# Patient Record
Sex: FEMALE | Race: ASIAN | NOT HISPANIC OR LATINO | URBAN - METROPOLITAN AREA
[De-identification: names, ages, dates, MRNs, and addresses within clinical notes are randomized per-mention and may not be internally consistent; named-entity substitution may affect disease eponyms.]

---

## 2019-06-26 ENCOUNTER — EMERGENCY (EMERGENCY)
Facility: HOSPITAL | Age: 54
LOS: 1 days | Discharge: HOME | End: 2019-06-26
Attending: EMERGENCY MEDICINE
Payer: COMMERCIAL

## 2019-06-26 VITALS
RESPIRATION RATE: 16 BRPM | DIASTOLIC BLOOD PRESSURE: 84 MMHG | HEIGHT: 63 IN | SYSTOLIC BLOOD PRESSURE: 113 MMHG | HEART RATE: 96 BPM | WEIGHT: 123.9 LBS | TEMPERATURE: 98 F | OXYGEN SATURATION: 98 %

## 2019-06-26 PROCEDURE — 99283 EMERGENCY DEPT VISIT LOW MDM: CPT

## 2019-06-26 RX ORDER — DIAZEPAM 5 MG
5 TABLET ORAL ONCE
Refills: 0 | Status: DISCONTINUED | OUTPATIENT
Start: 2019-06-26 | End: 2019-06-26

## 2019-06-26 RX ORDER — METHOCARBAMOL 500 MG/1
1 TABLET, FILM COATED ORAL
Qty: 15 | Refills: 0
Start: 2019-06-26 | End: 2019-06-30

## 2019-06-26 RX ORDER — IBUPROFEN 200 MG
600 TABLET ORAL ONCE
Refills: 0 | Status: COMPLETED | OUTPATIENT
Start: 2019-06-26 | End: 2019-06-26

## 2019-06-26 RX ORDER — IBUPROFEN 200 MG
1 TABLET ORAL
Qty: 30 | Refills: 0
Start: 2019-06-26

## 2019-06-26 RX ADMIN — Medication 600 MILLIGRAM(S): at 14:40

## 2019-06-26 RX ADMIN — Medication 5 MILLIGRAM(S): at 14:40

## 2019-06-26 RX ADMIN — Medication 600 MILLIGRAM(S): at 14:42

## 2019-06-26 NOTE — ED PROVIDER NOTE - OBJECTIVE STATEMENT
54 y/o F patient with no significant PMHx and no significant PSHx presents to the ED with lower back pain which began x2-x3 days ago. Patient denies trauma or injury. Pt states her pain is localized to the lower back with no radiation into the legs, upper back or abdomen. Patient says her pain is worse with twisting and bending, however, better with heat and lying still. Patient denies fever, chest pain, SOB, nausea, vomiting, diarrhea, abdominal pain, urinary complaints, numbness/ tingling in the legs, and any other complaints. Patient says she has tried Naproxen with mild relief.

## 2019-06-26 NOTE — ED PROVIDER NOTE - CLINICAL SUMMARY MEDICAL DECISION MAKING FREE TEXT BOX
Lower back pain suspected MSK ideology. Will d/c with additional meds such as muscle relaxer and ibuprofen.

## 2019-06-26 NOTE — ED PROVIDER NOTE - CHPI ED SYMPTOMS NEG
no tingling/No fever, No chest pain, No SOB, No nausea, No vomiting, No diarrhea, No abdominal pain, No urinary complaints/no numbness

## 2019-06-26 NOTE — ED ADULT NURSE NOTE - OBJECTIVE STATEMENT
Pt c/o low back pain x 2 days, denies any injury/ trauma to the area. Denies difficulty urinating. No acute distress noted, denies chest pain, no shortness of breath Indicated. Safety maintained.

## 2020-12-04 ENCOUNTER — EMERGENCY (EMERGENCY)
Facility: HOSPITAL | Age: 55
LOS: 1 days | Discharge: HOME | End: 2020-12-04
Attending: EMERGENCY MEDICINE | Admitting: EMERGENCY MEDICINE
Payer: MEDICAID

## 2020-12-04 VITALS
SYSTOLIC BLOOD PRESSURE: 134 MMHG | TEMPERATURE: 98 F | HEIGHT: 63 IN | DIASTOLIC BLOOD PRESSURE: 104 MMHG | RESPIRATION RATE: 16 BRPM | OXYGEN SATURATION: 100 % | HEART RATE: 100 BPM

## 2020-12-04 PROCEDURE — 99283 EMERGENCY DEPT VISIT LOW MDM: CPT

## 2020-12-04 NOTE — ED ADULT TRIAGE NOTE - CHIEF COMPLAINT QUOTE
RT arm pain x 3 weeks and blotching to upper arm. +ROM to affected extremity. +radial pulse and no discoloration observed. Denies any injury to affected extremity/ CP/SOB. Hx Anxiety, HTN, Arthritis, HLD

## 2020-12-05 VITALS
SYSTOLIC BLOOD PRESSURE: 130 MMHG | HEART RATE: 85 BPM | RESPIRATION RATE: 16 BRPM | OXYGEN SATURATION: 98 % | DIASTOLIC BLOOD PRESSURE: 85 MMHG

## 2020-12-05 PROBLEM — J45.909 UNSPECIFIED ASTHMA, UNCOMPLICATED: Chronic | Status: ACTIVE | Noted: 2019-06-26

## 2020-12-05 PROBLEM — E78.00 PURE HYPERCHOLESTEROLEMIA, UNSPECIFIED: Chronic | Status: ACTIVE | Noted: 2019-06-26

## 2020-12-05 PROBLEM — F41.9 ANXIETY DISORDER, UNSPECIFIED: Chronic | Status: ACTIVE | Noted: 2019-06-26

## 2020-12-05 PROBLEM — I10 ESSENTIAL (PRIMARY) HYPERTENSION: Chronic | Status: ACTIVE | Noted: 2019-06-26

## 2020-12-05 PROCEDURE — 73030 X-RAY EXAM OF SHOULDER: CPT | Mod: 26,RT

## 2020-12-05 RX ORDER — DIAZEPAM 5 MG
1 TABLET ORAL
Qty: 9 | Refills: 0
Start: 2020-12-05 | End: 2020-12-07

## 2020-12-05 RX ORDER — IBUPROFEN 200 MG
600 TABLET ORAL ONCE
Refills: 0 | Status: COMPLETED | OUTPATIENT
Start: 2020-12-05 | End: 2020-12-05

## 2020-12-05 RX ORDER — DIAZEPAM 5 MG
5 TABLET ORAL ONCE
Refills: 0 | Status: DISCONTINUED | OUTPATIENT
Start: 2020-12-05 | End: 2020-12-05

## 2020-12-05 RX ADMIN — Medication 5 MILLIGRAM(S): at 01:42

## 2020-12-05 RX ADMIN — Medication 600 MILLIGRAM(S): at 01:42

## 2020-12-05 NOTE — ED PROVIDER NOTE - PROGRESS NOTE DETAILS
Cory Murillo, PGY2: Patient feels better after medication. Likely radiculopathy. Will give spine follow up for persistent sx. Discussed results and plan w/ patient and daughter Paty and all questions answered. Will send Valium for pain relief. VSS, stable for d/c.

## 2020-12-05 NOTE — ED PROVIDER NOTE - ATTENDING CONTRIBUTION TO CARE
Attending Statement: I have personally seen and examined this patient. I have fully participated in the care of this patient. I have reviewed all pertinent clinical information, including history physical exam, plan and the Resident's note and agree except as noted  54yo F hx of HTN, HLD, asthma pw atraumatic right arm pain, pain starts on the neck radiate down the right arm.  no trauma. no swelling. no numbness/weakness. Worse at night. Easy bruise per family.   hx obtained from daughter on the phone.  Vital signs noted. no distress. mmm. normal S1-S2 No resp distress. able to speak in full and clear sentences. no wheeze, rales or stridor. nontender midcervical spine. no swelling no deformity of right arm. nl rom of right shoulder/elbow/wrist. nl    plan xr of shoulder/ neck, labs, pain med Attending Statement: I have personally seen and examined this patient. I have fully participated in the care of this patient. I have reviewed all pertinent clinical information, including history physical exam, plan and the Resident's note and agree except as noted  54yo F hx of HTN, HLD, asthma pw atraumatic right arm pain, pain starts on the neck radiate down the right arm.  no trauma. no swelling. no numbness/weakness. Worse at night. Easy bruise per family.   hx obtained from daughter on the phone.  Vital signs noted. no distress. mmm. normal S1-S2 No resp distress. able to speak in full and clear sentences. no wheeze, rales or stridor. nontender midcervical spine. no swelling no deformity of right arm. nl rom of right shoulder/elbow/wrist. nl    plan xr of shoulder, labs, pain med

## 2020-12-05 NOTE — ED PROVIDER NOTE - OBJECTIVE STATEMENT
55 year old female PMH asthma, HTN, HLD, p/w R shoulder/arm pain. States onset 3 weeks ago, atraumatic, has been worsening since. Pain is making it difficult for her to sleep. Taking Tylenol daily w/o relief. Daughter also notes easy bruising and a reticular rash of the RUE. Saw her PCP and concerned this may be rheumatologic. Patient has not followed w/ rheum yet. Denies f/c, cp, sob, weakness, or numbness/tingling.     Patient Maori speaking - daughter Paty [phone] utilized for translation.

## 2020-12-05 NOTE — ED ADULT NURSE NOTE - OBJECTIVE STATEMENT
received to room 19. complains of right shoulder/arm pain x 3 weeks. denies any injury. has bruising/rash to RUE. denies any other complaints.   Patient Hungarian speaking - daughter Paty [phone] utilized for translation during MD martinez. received to room 19. complains of right shoulder/arm pain x 3 weeks. denies any injury. denies any other complaints. cbc sent as per order. endorsed to DAVID Rocha  Patient Danish speaking - daughter Paty [phone] utilized for translation (RN present during MD martinez)

## 2020-12-05 NOTE — ED PROVIDER NOTE - NSFOLLOWUPINSTRUCTIONS_ED_ALL_ED_FT
You were seen in the ER for right arm and shoulder pain. Your lab results and x-ray were reassuring. This is likely coming from the neck and tight muscles. Take Motrin 600 mg every 8 hours as needed for moderate pain -- take with food. Take Valium for severe pain and to help with sleep. Do not take while operating heavy machinery as it may make you drowsy.     If your symptoms persist, you may call to schedule a follow up appointment in [facility]. The phone number is [phone].    Rest, drink plenty of fluids.  Advance activity as tolerated.  Continue all previously prescribed medications as directed.  Follow up with your primary care physician in 48-72 hours- bring copies of your results.  Return to the ER for worsening or persistent symptoms, and/or ANY NEW OR CONCERNING SYMPTOMS. If you have issues obtaining follow up, please call: [phone] to obtain a doctor or specialist who takes your insurance in your area.

## 2020-12-05 NOTE — ED PROVIDER NOTE - CLINICAL SUMMARY MEDICAL DECISION MAKING FREE TEXT BOX
Cory Murillo, PGY2: 55 year old female p/w R shoulder/arm pain x3 weeks, atraumatic. Hypertonic musculature, likely radiculopathy. FROM, NVI. Doubt UE DVT given lack of risk factors and no swelling. Plan for CBC given easy bruising, XR r/o fx, pain control, d/c w/ PCP follow up.

## 2020-12-05 NOTE — ED PROVIDER NOTE - PATIENT PORTAL LINK FT
You can access the FollowMyHealth Patient Portal offered by [facility] by registering at the following website: [email or web address]. By joining [employer]’s FollowMyHealth portal, you will also be able to view your health information using other applications (apps) compatible with our system.

## 2020-12-05 NOTE — ED ADULT NURSE NOTE - NSFALLRSKASSESSTYPE_ED_ALL_ED
89 yo female with PMH of HTN, HLD, osteopenia, arthritis, TIA, anemia, kidney disease, recently treated for UTI. Patient's family noted confusion during her hospital stay in [place] which improved while she flew back to [place] but slowly returned later in the day. Daughter reported that patient is normally "sharp as a tack" with no signs of dementia. Currently, daughter reports that patient recognizes her family members but is not making sense and has become agitated.
Initial (On Arrival)

## 2020-12-05 NOTE — ED PROVIDER NOTE - PHYSICAL EXAMINATION
GENERAL: A&Ox3, non-toxic appearing, no acute distress, Spanish speaking  HEENT: NCAT, EOMI, oral mucosa moist, normal conjunctiva  RESP: CTAB, no respiratory distress, no wheezes/rhonchi/rales, speaking in full sentences  CV: RRR, no murmurs/rubs/gallops  ABDOMEN: soft, non-tender, non-distended, no guarding  MSK: no visible deformities, R shoulder musculature hypertonic, FROM RUE, no swelling of RUE  NEURO: no focal sensory or motor deficits, AIN/PIN/R/U nerves intact, SILT  SKIN: warm, normal color, well perfused, faint reticular rash of LUE  PSYCH: normal affect    -Cory Murillo, PGY2

## 2020-12-05 NOTE — ED PROVIDER NOTE - NS ED ROS FT
GENERAL: no fever, no chills  EYES: no change in vision, no irritation  HEENT: no trouble swallowing or speaking  CARDIAC: no chest pain, no palpitations   PULMONARY: no cough, no shortness of breath, no wheezing  GI: no abdominal pain, no nausea, no vomiting  : no changes in urination  SKIN: +rash  HEME: +easy bruising  NEURO: no headache, no numbness, no weakness  MSK: +joint pain, +muscle pain, no back pain, no calf pain     -Cory Murillo, PGY2

## 2020-12-30 ENCOUNTER — EMERGENCY (EMERGENCY)
Facility: HOSPITAL | Age: 55
LOS: 1 days | Discharge: HOME | End: 2020-12-30
Attending: EMERGENCY MEDICINE | Admitting: EMERGENCY MEDICINE
Payer: MEDICAID

## 2020-12-30 VITALS
HEIGHT: 63 IN | OXYGEN SATURATION: 100 % | RESPIRATION RATE: 16 BRPM | SYSTOLIC BLOOD PRESSURE: 107 MMHG | TEMPERATURE: 97 F | HEART RATE: 99 BPM | DIASTOLIC BLOOD PRESSURE: 79 MMHG

## 2020-12-30 PROCEDURE — 99283 EMERGENCY DEPT VISIT LOW MDM: CPT

## 2020-12-30 RX ORDER — ACETAMINOPHEN 500 MG
650 TABLET ORAL ONCE
Refills: 0 | Status: COMPLETED | OUTPATIENT
Start: 2020-12-30 | End: 2020-12-30

## 2020-12-30 RX ORDER — DICLOFENAC SODIUM 30 MG/G
2 GEL TOPICAL
Qty: 60 | Refills: 0
Start: 2020-12-30 | End: 2021-01-08

## 2020-12-30 RX ORDER — LIDOCAINE 4 G/100G
1 CREAM TOPICAL ONCE
Refills: 0 | Status: COMPLETED | OUTPATIENT
Start: 2020-12-30 | End: 2020-12-30

## 2020-12-30 RX ADMIN — LIDOCAINE 1 PATCH: 4 CREAM TOPICAL at 17:32

## 2020-12-30 RX ADMIN — Medication 650 MILLIGRAM(S): at 17:37

## 2020-12-30 NOTE — ED PROVIDER NOTE - PATIENT PORTAL LINK FT
You can access the FollowMyHealth Patient Portal offered by [facility] by registering at the following website: [email or web address]. By joining [employer]’s FollowMyHealth portal, you will also be able to view your health information using other applications (apps) compatible with our system.

## 2020-12-30 NOTE — ED PROVIDER NOTE - NS ED ROS FT
Constitutional: (-) fever   Head: Normal cephalic, Atraumatic  Eyes/ENT: (-) vision changes  Cardiovascular: (-) chest pain, (-) wheezing  Respiratory: (-) cough, (-) shortness of breath  Gastrointestinal: (-) vomiting, (-) diarrhea, (-) abdominal pain  : (-) dysuria   Musculoskeletal: (+) LT shoulder pain  Integumentary: (-) rash, (-) edema  Neurological: (-)loc  Allergic/Immunologic: (-) pruritus

## 2020-12-30 NOTE — ED PROVIDER NOTE - PHYSICAL EXAMINATION
Vital signs reviewed.   CONSTITUTIONAL: Well-appearing; well-nourished; in no apparent distress. Non-toxic appearing.   HEAD: Normocephalic, atraumatic.  EYES: PERRL, EOM intact, conjunctiva and no sclera injection noted  ENT: normal nose; no rhinorrhea  NECK: No midline tenderness. FAROM  CARD: Normal S1, S2  RESP: Normal chest excursion with respiration; breath sounds clear and equal bilaterally  ABD/GI: soft, non-distended; non-tender  EXT/MS: LT shoulder      moves all extremities; distal pulses are normal, no pedal edema.  SKIN: Normal for age and race; warm; dry; good turgor; no apparent lesions or exudate noted.  NEURO: Awake, alert, oriented x 3, no gross deficits, CN II-XII grossly intact, no motor or sensory deficit noted.  PSYCH: Normal mood; appropriate affect. Vital signs reviewed.   CONSTITUTIONAL: Well-appearing; well-nourished; in no apparent distress. Non-toxic appearing.   HEAD: Normocephalic, atraumatic.  EYES: PERRL, EOM intact, conjunctiva and no sclera injection noted  ENT: normal nose; no rhinorrhea  NECK: No midline tenderness. FAROM  CARD: Normal S1, S2  RESP: Normal chest excursion with respiration; breath sounds clear and equal bilaterally  ABD/GI: soft, non-distended; non-tender  EXT/MS: RT shoulder FAROM, Diminished strength. Negative Drop arm. Negative Apley scratch. Radial pulse +2. Negative Spurling  SKIN: Normal for age and race; warm; dry; good turgor; no apparent lesions or exudate noted.  NEURO: Awake, alert, oriented x 3, no gross deficits, CN II-XII grossly intact, no motor or sensory deficit noted.  PSYCH: Normal mood; appropriate affect.

## 2020-12-30 NOTE — ED PROVIDER NOTE - NSFOLLOWUPINSTRUCTIONS_ED_ALL_ED_FT
1) Please follow up w/ orthopedic clinic for further evaluation.   2) Return to the ED immediately for new or worsening symptoms including chest pain, shortness of breath, nausea/vomiting or fever  3) Please continue to take any home medications as prescribed. Take Tylenol 325 mg every 4 hours for pain relief/fever control or ibuprofen 600 mg every 6 hours for pain relief/fever control  4) Your test results from your ED visit were discussed with you prior to discharge  5) You were provided with a copy of your test results

## 2020-12-30 NOTE — ED PROVIDER NOTE - CHIEF COMPLAINT
The patient is a 55y Female complaining of  The patient is a 55y Female complaining of RT shoulder pain

## 2020-12-30 NOTE — ED PROVIDER NOTE - OBJECTIVE STATEMENT
56 Y/O F w/ PMH of HTN and HLD presents to ER for atraumatic RT shoulder pain. Has experienced pain on and off for the past 3 months. Evaluated in ER on 12/5 XR demonstrated no acute findings. Continues to endorse pain w/ movement and at rest. Takes Tylenol for pain w/ minimal relief. Has tried Hot and cold compress w/ no relief. Denies fever, nausea/vomiting, dizziness, headache, chest pain, SOB, numbness/tingling, back pain or neck pain. Heron . Discussed w/ daughter

## 2020-12-30 NOTE — ED ADULT TRIAGE NOTE - NS ED NURSE BANDS TYPE
HPI:    Patient is a 57 y.o. female in no acute distress. She is alert and oriented to person, place, and time. History  2017 Patient was referred by gynecology for incontinence. Patient does have stress incontinence with coughing and sneezing. Patient also reports urgency, frequency, nocturia x3-4, and urge incontinence. Patient does have a daily bowel movement. Patient does drink coffee and a lot of mountain dew. Patient has had no urologic consult prior. She has never had gross hematuria. She reports no stones. Patient does not have dyspareunia. She is a teacher. Educated on reducing bladder irritants. Started on detrol 4mg.      2020 went from wearing a pad occasionally to wearing a pad all day. Stop Detrol, started oxybutynin. Today  Here today to follow-up for overactive bladder after changing to oxybutynin. She does not feel that she has any change in her urinary symptoms. She is urinating every 2-3 hours during the day, she has nocturia x1, she has both urge and stress incontinence. She is wearing 1 pad per day. She continues to consume coffee. She is having worsening constipation since starting the oxybutynin. She did resume MiraLAX daily. Past Medical History:   Diagnosis Date    Anemia     Arthritis     in thumbs    Headache      Past Surgical History:   Procedure Laterality Date     SECTION  6123,0755    CHOLECYSTECTOMY      FOOT SURGERY Right      Outpatient Encounter Medications as of 2020   Medication Sig Dispense Refill    oxybutynin (DITROPAN XL) 10 MG extended release tablet Take 1 tablet by mouth daily 30 tablet 3    polyethylene glycol (MIRALAX) powder Take 17 g by mouth daily      loratadine-pseudoephedrine (CLARITIN-D 12HR) 5-120 MG per extended release tablet Take 1 tablet by mouth 2 times daily      Zinc Sulfate (ZINC 15 PO) Take  by mouth.  B Complex Vitamins (VITAMIN-B COMPLEX PO) Take  by mouth.        No facility-administered encounter medications on file as of 8/7/2020. Current Outpatient Medications on File Prior to Visit   Medication Sig Dispense Refill    oxybutynin (DITROPAN XL) 10 MG extended release tablet Take 1 tablet by mouth daily 30 tablet 3    polyethylene glycol (MIRALAX) powder Take 17 g by mouth daily      loratadine-pseudoephedrine (CLARITIN-D 12HR) 5-120 MG per extended release tablet Take 1 tablet by mouth 2 times daily      Zinc Sulfate (ZINC 15 PO) Take  by mouth.  B Complex Vitamins (VITAMIN-B COMPLEX PO) Take  by mouth. No current facility-administered medications on file prior to visit. Patient has no known allergies. Family History   Problem Relation Age of Onset    Breast Cancer Paternal Aunt     Cancer Maternal Aunt         ovarian    Other Mother     Other Father      Social History     Tobacco Use   Smoking Status Never Smoker   Smokeless Tobacco Never Used       Social History     Substance and Sexual Activity   Alcohol Use Yes    Comment: rarely       Review of Systems   Constitutional: Negative for appetite change, chills and fever. Eyes: Negative for pain, redness and visual disturbance. Respiratory: Negative for cough, shortness of breath and wheezing. Cardiovascular: Negative for chest pain and leg swelling. Gastrointestinal: Positive for constipation. Negative for abdominal pain, nausea and vomiting. Genitourinary: Positive for enuresis (Mixed incontinence). Negative for difficulty urinating, dysuria, flank pain, frequency, hematuria, pelvic pain, vaginal bleeding and vaginal discharge. Musculoskeletal: Negative for back pain, joint swelling and myalgias. Skin: Negative for color change, rash and wound. Neurological: Negative for dizziness, tremors and numbness. Hematological: Negative for adenopathy. Does not bruise/bleed easily.        BP (!) 150/98 (Site: Right Upper Arm)   Temp 98.8 °F (37.1 °C)   Wt 273 lb (123.8 kg)   BMI 44.06 kg/m² PHYSICAL EXAM:  Constitutional: Patient resting comfortably, in no acute distress. Neuro: Alert and oriented to person place and time. Cranial nerves grossly intact. Psych: Mood and affect normal.  Skin: Warm, dry  HEENT: normocephalic, atraumatic  Lymphatics: No palpable lymphadenopathy  Lungs: Respiratory effort normal, unlabored  Cardiovascular:  Normal peripheral pulses  Abdomen: Soft, non-tender, non-distended with no organomegaly or palpable masses. : No CVA tenderness. Bladder non-tender and not distended. Pelvic: Deferred    No results found for: BUN  No results found for: CREATININE    ASSESSMENT:   Diagnosis Orders   1. OAB (overactive bladder)  FL MEASUREMENT,POST-VOID RESIDUAL VOLUME BY US,NON-IMAGING   2. Frequency of urination  FL MEASUREMENT,POST-VOID RESIDUAL VOLUME BY US,NON-IMAGING   3. Mixed incontinence urge and stress  FL MEASUREMENT,POST-VOID RESIDUAL VOLUME BY US,NON-IMAGING   4. Nocturia  FL MEASUREMENT,POST-VOID RESIDUAL VOLUME BY US,NON-IMAGING           PLAN:  Discussed bladder irritants thoroughly. Patient instructed to avoid/minimize intake of food/ drinks such as: coffee, tea, caffeine, alcohol, carbonated beverages, soda pop, spicy/acidic foods. Was sent home with a extensive list, including non-irritating alternatives. I did explain to her that normal urination is urinating every 2-3 hours during the day, nocturia less than twice per night, and no incontinence. I do not think alternative medication would improve her urinary symptoms. I did offer pelvic floor therapy, but patient is not interested in this at this time. Because the oxybutynin is causing constipation, we will change her to trospium twice per day. We will have her back in 6 to 8 weeks to reevaluate her urinary symptoms and her bowel habits. Name band;

## 2020-12-30 NOTE — ED ADULT TRIAGE NOTE - CHIEF COMPLAINT QUOTE
pt c/o right shoulder/arm pain x3 months. States she was here two weeks ago for same symptoms, had x-ray done with negative findings. +ROM to extremity. Denies fever, chills, CP. PMH HTN. Daughter Paty can be reached at [phone]

## 2020-12-30 NOTE — ED PROVIDER NOTE - CLINICAL SUMMARY MEDICAL DECISION MAKING FREE TEXT BOX
56 Y/O F w/ PMH of HTN and HLD presents to ER for atraumatic RT shoulder pain. Reviewed previous XR. Discussed importance of Orthopedic follow up for further management and use of OTC pain medication for relief.

## 2020-12-30 NOTE — ED ADULT TRIAGE NOTE - AS HEIGHT TYPE
Follow-up of GERD was discussed.   The patient has typically complained of   heartburn  .      Treatment has consisted of   Protonix  taken at   once daily  .   This therapy has been associated with   minimal   relief.   The patient   has   been having breakthru GERD symptoms.  Symptoms do   awakens the patient from sleep  .    Has been treating residual symptoms with   .   Continuing symptoms may be brought on by   eating meals late at night  .  stated

## 2020-12-30 NOTE — ED PROVIDER NOTE - ATTENDING CONTRIBUTION TO CARE
agree with PA note    "· HPI Objective Statement: 56 Y/O F w/ PMH of HTN and HLD presents to ER for atraumatic RT shoulder pain. Has experienced pain on and off for the past 3 months. Evaluated in ER on 12/5 XR demonstrated no acute findings. Continues to endorse pain w/ movement and at rest. Takes Tylenol for pain w/ minimal relief. Has tried Hot and cold compress w/ no relief. Denies fever, nausea/vomiting, dizziness, headache, chest pain, SOB, numbness/tingling, back pain or neck pain. Heron . Discussed w/ daughter  "    PE: well appearing; VSS: CTAB/L; s1 s2 no m/r/g abd soft/NT/ND ext: FROm of shoulder; no deformity vasc: 2+ radial pulse    Imp: atraumatic shoulder pain; no evidence of fx or dislocation; to f/u with ortho for MRI

## 2021-01-14 ENCOUNTER — EMERGENCY (EMERGENCY)
Facility: HOSPITAL | Age: 56
LOS: 1 days | Discharge: HOME | End: 2021-01-14
Attending: EMERGENCY MEDICINE | Admitting: EMERGENCY MEDICINE
Payer: MEDICAID

## 2021-01-14 VITALS
OXYGEN SATURATION: 100 % | TEMPERATURE: 98 F | DIASTOLIC BLOOD PRESSURE: 87 MMHG | RESPIRATION RATE: 16 BRPM | SYSTOLIC BLOOD PRESSURE: 153 MMHG | HEART RATE: 77 BPM

## 2021-01-14 VITALS
TEMPERATURE: 98 F | HEIGHT: 63 IN | RESPIRATION RATE: 18 BRPM | SYSTOLIC BLOOD PRESSURE: 158 MMHG | DIASTOLIC BLOOD PRESSURE: 94 MMHG | HEART RATE: 71 BPM | OXYGEN SATURATION: 100 %

## 2021-01-14 LAB
ALBUMIN SERPL ELPH-MCNC: 4.3 G/DL — SIGNIFICANT CHANGE UP (ref 3.3–5)
ALP SERPL-CCNC: 92 U/L — SIGNIFICANT CHANGE UP (ref 40–120)
ALT FLD-CCNC: 11 U/L — SIGNIFICANT CHANGE UP (ref 4–33)
ANION GAP SERPL CALC-SCNC: 10 MMOL/L — SIGNIFICANT CHANGE UP (ref 7–14)
APPEARANCE UR: CLEAR — SIGNIFICANT CHANGE UP
AST SERPL-CCNC: 14 U/L — SIGNIFICANT CHANGE UP (ref 4–32)
BACTERIA # UR AUTO: ABNORMAL
BASOPHILS # BLD AUTO: 0.02 K/UL — SIGNIFICANT CHANGE UP (ref 0–0.2)
BASOPHILS NFR BLD AUTO: 0.2 % — SIGNIFICANT CHANGE UP (ref 0–2)
BILIRUB SERPL-MCNC: 0.4 MG/DL — SIGNIFICANT CHANGE UP (ref 0.2–1.2)
BILIRUB UR-MCNC: NEGATIVE — SIGNIFICANT CHANGE UP
BUN SERPL-MCNC: 12 MG/DL — SIGNIFICANT CHANGE UP (ref 7–23)
CALCIUM SERPL-MCNC: 9.1 MG/DL — SIGNIFICANT CHANGE UP (ref 8.4–10.5)
CHLORIDE SERPL-SCNC: 103 MMOL/L — SIGNIFICANT CHANGE UP (ref 98–107)
CO2 SERPL-SCNC: 24 MMOL/L — SIGNIFICANT CHANGE UP (ref 22–31)
COLOR SPEC: SIGNIFICANT CHANGE UP
CREAT SERPL-MCNC: 0.67 MG/DL — SIGNIFICANT CHANGE UP (ref 0.5–1.3)
DIFF PNL FLD: NEGATIVE — SIGNIFICANT CHANGE UP
EOSINOPHIL # BLD AUTO: 0.02 K/UL — SIGNIFICANT CHANGE UP (ref 0–0.5)
EOSINOPHIL NFR BLD AUTO: 0.2 % — SIGNIFICANT CHANGE UP (ref 0–6)
EPI CELLS # UR: 0 /HPF — SIGNIFICANT CHANGE UP (ref 0–5)
GLUCOSE SERPL-MCNC: 130 MG/DL — HIGH (ref 70–99)
GLUCOSE UR QL: NEGATIVE — SIGNIFICANT CHANGE UP
HCT VFR BLD CALC: 35.1 % — SIGNIFICANT CHANGE UP (ref 34.5–45)
HGB BLD-MCNC: 11.3 G/DL — LOW (ref 11.5–15.5)
HYALINE CASTS # UR AUTO: 1 /LPF — SIGNIFICANT CHANGE UP (ref 0–7)
IANC: 11.29 K/UL — HIGH (ref 1.5–8.5)
IMM GRANULOCYTES NFR BLD AUTO: 0.5 % — SIGNIFICANT CHANGE UP (ref 0–1.5)
KETONES UR-MCNC: NEGATIVE — SIGNIFICANT CHANGE UP
LEUKOCYTE ESTERASE UR-ACNC: ABNORMAL
LIDOCAIN IGE QN: 13 U/L — SIGNIFICANT CHANGE UP (ref 7–60)
LYMPHOCYTES # BLD AUTO: 1.23 K/UL — SIGNIFICANT CHANGE UP (ref 1–3.3)
LYMPHOCYTES # BLD AUTO: 9.5 % — LOW (ref 13–44)
MCHC RBC-ENTMCNC: 28.5 PG — SIGNIFICANT CHANGE UP (ref 27–34)
MCHC RBC-ENTMCNC: 32.2 GM/DL — SIGNIFICANT CHANGE UP (ref 32–36)
MCV RBC AUTO: 88.4 FL — SIGNIFICANT CHANGE UP (ref 80–100)
MONOCYTES # BLD AUTO: 0.3 K/UL — SIGNIFICANT CHANGE UP (ref 0–0.9)
MONOCYTES NFR BLD AUTO: 2.3 % — SIGNIFICANT CHANGE UP (ref 2–14)
NEUTROPHILS # BLD AUTO: 11.29 K/UL — HIGH (ref 1.8–7.4)
NEUTROPHILS NFR BLD AUTO: 87.3 % — HIGH (ref 43–77)
NITRITE UR-MCNC: NEGATIVE — SIGNIFICANT CHANGE UP
NRBC # BLD: 0 /100 WBCS — SIGNIFICANT CHANGE UP
NRBC # FLD: 0 K/UL — SIGNIFICANT CHANGE UP
PH UR: 6.5 — SIGNIFICANT CHANGE UP (ref 5–8)
PLATELET # BLD AUTO: 242 K/UL — SIGNIFICANT CHANGE UP (ref 150–400)
POTASSIUM SERPL-MCNC: 4.1 MMOL/L — SIGNIFICANT CHANGE UP (ref 3.5–5.3)
POTASSIUM SERPL-SCNC: 4.1 MMOL/L — SIGNIFICANT CHANGE UP (ref 3.5–5.3)
PROT SERPL-MCNC: 7.8 G/DL — SIGNIFICANT CHANGE UP (ref 6–8.3)
PROT UR-MCNC: ABNORMAL
RBC # BLD: 3.97 M/UL — SIGNIFICANT CHANGE UP (ref 3.8–5.2)
RBC # FLD: 12.1 % — SIGNIFICANT CHANGE UP (ref 10.3–14.5)
RBC CASTS # UR COMP ASSIST: 1 /HPF — SIGNIFICANT CHANGE UP (ref 0–4)
SODIUM SERPL-SCNC: 137 MMOL/L — SIGNIFICANT CHANGE UP (ref 135–145)
SP GR SPEC: 1.02 — SIGNIFICANT CHANGE UP (ref 1.01–1.02)
UROBILINOGEN FLD QL: SIGNIFICANT CHANGE UP
WBC # BLD: 12.93 K/UL — HIGH (ref 3.8–10.5)
WBC # FLD AUTO: 12.93 K/UL — HIGH (ref 3.8–10.5)
WBC UR QL: 14 /HPF — HIGH (ref 0–5)

## 2021-01-14 PROCEDURE — 99284 EMERGENCY DEPT VISIT MOD MDM: CPT

## 2021-01-14 PROCEDURE — 74176 CT ABD & PELVIS W/O CONTRAST: CPT | Mod: 26,GC

## 2021-01-14 RX ORDER — GABAPENTIN 400 MG/1
1 CAPSULE ORAL
Qty: 14 | Refills: 0
Start: 2021-01-14 | End: 2021-01-27

## 2021-01-14 RX ORDER — OXYCODONE AND ACETAMINOPHEN 5; 325 MG/1; MG/1
2 TABLET ORAL ONCE
Refills: 0 | Status: DISCONTINUED | OUTPATIENT
Start: 2021-01-14 | End: 2021-01-14

## 2021-01-14 RX ORDER — OXYCODONE AND ACETAMINOPHEN 5; 325 MG/1; MG/1
1 TABLET ORAL ONCE
Refills: 0 | Status: DISCONTINUED | OUTPATIENT
Start: 2021-01-14 | End: 2021-01-14

## 2021-01-14 RX ORDER — SODIUM CHLORIDE 9 MG/ML
1000 INJECTION INTRAMUSCULAR; INTRAVENOUS; SUBCUTANEOUS ONCE
Refills: 0 | Status: COMPLETED | OUTPATIENT
Start: 2021-01-14 | End: 2021-01-14

## 2021-01-14 RX ORDER — GABAPENTIN 400 MG/1
300 CAPSULE ORAL ONCE
Refills: 0 | Status: COMPLETED | OUTPATIENT
Start: 2021-01-14 | End: 2021-01-14

## 2021-01-14 RX ADMIN — GABAPENTIN 300 MILLIGRAM(S): 400 CAPSULE ORAL at 14:53

## 2021-01-14 RX ADMIN — SODIUM CHLORIDE 1000 MILLILITER(S): 9 INJECTION INTRAMUSCULAR; INTRAVENOUS; SUBCUTANEOUS at 12:17

## 2021-01-14 RX ADMIN — OXYCODONE AND ACETAMINOPHEN 1 TABLET(S): 5; 325 TABLET ORAL at 10:00

## 2021-01-14 RX ADMIN — SODIUM CHLORIDE 1000 MILLILITER(S): 9 INJECTION INTRAMUSCULAR; INTRAVENOUS; SUBCUTANEOUS at 10:36

## 2021-01-14 NOTE — ED ADULT TRIAGE NOTE - CHIEF COMPLAINT QUOTE
Patient complaining of generalized body pain and was seen at [facility] on Dec 30 for similar sx and advised to f/u with neurology.  Pain worse now and she was unable to wait for her appt.  Denies any other s/sx at this time.

## 2021-01-14 NOTE — ED PROVIDER NOTE - PROGRESS NOTE DETAILS
pt improved, able to walk from bed to chair- about 5 feet.  will give cane and neurontin at home qhs, to f/u with neuro in next 2 weeks.  pt would be harmed by inpatient stay.  she is very dependant on daughter.  offered inpt admission for PE eval, but pts daughter will arrange with outpt PMD

## 2021-01-14 NOTE — ED PROVIDER NOTE - NSFOLLOWUPINSTRUCTIONS_ED_ALL_ED_FT
you have an exam consistent with sciatica.  this is painful and may last for some time.  you should start the neurontin every night and arrange promptly to f/u with neurology.    you would benefit from an evaluation for physical therapy for this    return to the ED for worsening or problems with your bowel or bladder    You are being discharged from the Emergency Department after evaluation of your presenting problem.  You were found not to have an emergency that requires hospitalization or surgery, but this does not mean you do not have a health concern.  You should follow up with your primary care physician and any other physicians suggested at time of discharge.  Also, if your condition worsens or changes know that the emergency department is open and available 24 hours a day/ 7 days a week and you should return to us if you have concerns. Thank you for allowing us to participate in your care.

## 2021-01-14 NOTE — ED PROVIDER NOTE - CLINICAL SUMMARY MEDICAL DECISION MAKING FREE TEXT BOX
hayden sciatica, but very difficult history and physical.  outside chance of renal stone, but exam so difficult will ct.  discussed with daughter. hayden sciatica, but very difficult history and physical.  outside chance of renal stone, but exam so difficult will ct.  discussed with daughter.    ct no stone, no abdominal pathology

## 2021-01-14 NOTE — ED PROVIDER NOTE - PHYSICAL EXAMINATION
pt moaning and rolling on stretcher  h at/nc  perrl, conj clear, sclera anicteric,  neck supple  throat no exudate  cor rrr pos s1s2  lungs clear to asno wheeze  abd soft no r/g/t  ext no edema no deformities  neueo awake, lucid normal gait moves all extremities with strength  psych overwhelming anxiety

## 2021-01-14 NOTE — ED ADULT NURSE NOTE - OBJECTIVE STATEMENT
Pt received in Intake 8.  Non-English speaking, except for name.  Unable to verify .   used to verify information.  As per , pt endorses right arm pain that resolved and then right waist pain that resolved and currently having right sided buttock pain that radiates leg, worse when attempting to stand and pain is worse when trying to sit on toilet bowl.  Warm packs applied until daughter arrives to confirm information.   Daughter called and currently at bedside.

## 2021-01-14 NOTE — ED ADULT NURSE NOTE - NSIMPLEMENTINTERV_GEN_ALL_ED
Implemented All Fall Risk Interventions:  [place] to call system. Call bell, personal items and telephone within reach. Instruct patient to call for assistance. Room bathroom lighting operational. Non-slip footwear when patient is off stretcher. Physically safe environment: no spills, clutter or unnecessary equipment. Stretcher in lowest position, wheels locked, appropriate side rails in place. Provide visual cue, wrist band, yellow gown, etc. Monitor gait and stability. Monitor for mental status changes and reorient to person, place, and time. Review medications for side effects contributing to fall risk. Reinforce activity limits and safety measures with patient and family.

## 2021-01-14 NOTE — ED PROVIDER NOTE - PATIENT PORTAL LINK FT
You can access the FollowMyHealth Patient Portal offered by [facility] by registering at the following website: [email or web address]. By joining [employer]’s FollowMyHealth portal, you will also be able to view your health information using other applications (apps) compatible with our system.

## 2021-01-14 NOTE — ED PROVIDER NOTE - OBJECTIVE STATEMENT
id [id number]  wayne id [id number]  wayne    sunnyjenadwayne: pt with severe pain going down the right leg. bad for the last 2 days, had shoulder pain before.  pt with severe anxiety and dementia as per daughter, can't remember bd, but that is old.

## 2021-01-14 NOTE — ED ADULT NURSE NOTE - CHIEF COMPLAINT QUOTE
Patient complaining of generalized body pain and was seen at [facility] on Dec 30 for similar sx and advised to f/u with neurology.  Pain worse now and she was unable to wait for her appt.  Denies any other s/sx at this time.

## 2021-01-27 ENCOUNTER — OUTPATIENT (OUTPATIENT)
Dept: OUTPATIENT SERVICES | Facility: HOSPITAL | Age: 56
LOS: 1 days | End: 2021-01-27

## 2021-01-27 ENCOUNTER — APPOINTMENT (OUTPATIENT)
Dept: ORTHOPEDIC SURGERY | Facility: HOSPITAL | Age: 56
End: 2021-01-27

## 2021-01-27 VITALS
WEIGHT: 125 LBS | TEMPERATURE: 99 F | HEART RATE: 103 BPM | HEIGHT: 63 IN | BODY MASS INDEX: 22.15 KG/M2 | DIASTOLIC BLOOD PRESSURE: 81 MMHG | SYSTOLIC BLOOD PRESSURE: 125 MMHG

## 2021-01-27 DIAGNOSIS — M54.9 DORSALGIA, UNSPECIFIED: ICD-10-CM

## 2021-01-27 PROBLEM — Z00.00 ENCOUNTER FOR PREVENTIVE HEALTH EXAMINATION: Status: ACTIVE | Noted: 2021-01-27

## 2021-01-28 DIAGNOSIS — M54.9 DORSALGIA, UNSPECIFIED: ICD-10-CM

## 2022-05-15 ENCOUNTER — EMERGENCY (EMERGENCY)
Facility: HOSPITAL | Age: 57
LOS: 1 days | Discharge: HOME | End: 2022-05-15
Attending: EMERGENCY MEDICINE | Admitting: EMERGENCY MEDICINE
Payer: MEDICAID

## 2022-05-15 VITALS
TEMPERATURE: 98 F | SYSTOLIC BLOOD PRESSURE: 143 MMHG | RESPIRATION RATE: 18 BRPM | OXYGEN SATURATION: 99 % | HEIGHT: 63 IN | HEART RATE: 79 BPM | DIASTOLIC BLOOD PRESSURE: 87 MMHG

## 2022-05-15 VITALS
HEART RATE: 76 BPM | TEMPERATURE: 98 F | DIASTOLIC BLOOD PRESSURE: 94 MMHG | OXYGEN SATURATION: 100 % | RESPIRATION RATE: 17 BRPM | SYSTOLIC BLOOD PRESSURE: 147 MMHG

## 2022-05-15 PROCEDURE — 73590 X-RAY EXAM OF LOWER LEG: CPT | Mod: 26,LT

## 2022-05-15 PROCEDURE — 73610 X-RAY EXAM OF ANKLE: CPT | Mod: 26,LT

## 2022-05-15 PROCEDURE — 99284 EMERGENCY DEPT VISIT MOD MDM: CPT

## 2022-05-15 PROCEDURE — 73562 X-RAY EXAM OF KNEE 3: CPT | Mod: 26,LT

## 2022-05-15 PROCEDURE — 73620 X-RAY EXAM OF FOOT: CPT | Mod: 26,LT

## 2022-05-15 RX ORDER — ACETAMINOPHEN 500 MG
650 TABLET ORAL ONCE
Refills: 0 | Status: COMPLETED | OUTPATIENT
Start: 2022-05-15 | End: 2022-05-15

## 2022-05-15 RX ADMIN — Medication 650 MILLIGRAM(S): at 15:16

## 2022-05-15 NOTE — ED PROVIDER NOTE - WET READ LAUNCH FT
There are no Wet Read(s) to document.
Skin normal color for race, warm, dry and intact. No evidence of rash.

## 2022-05-15 NOTE — ED PROVIDER NOTE - OBJECTIVE STATEMENT
Dr Maldonado  56yoF hx of HTN, HLD, asthma, chronic lower back pain pw pain of the left foot, pt fell out of bed last night. Denies hitting head, no LOC. no neck pain. not dizzy or lightheaded, 'was sleeping and fell" Pain of the left foot, able to walk. no numbness/weakness. no break in skin.  no cp/sob/abdominal pain  Takes a baby aspirin daily.

## 2022-05-15 NOTE — ED PROVIDER NOTE - PATIENT PORTAL LINK FT
You can access the FollowMyHealth Patient Portal offered by [facility] by registering at the following website: [email or web address]. By joining [employer]’s FollowMyHealth portal, you will also be able to view your health information using other applications (apps) compatible with our system.

## 2023-02-18 ENCOUNTER — EMERGENCY (EMERGENCY)
Facility: HOSPITAL | Age: 58
LOS: 1 days | Discharge: HOME | End: 2023-02-18
Attending: EMERGENCY MEDICINE
Payer: MEDICAID

## 2023-02-18 VITALS
DIASTOLIC BLOOD PRESSURE: 72 MMHG | HEIGHT: 65 IN | HEART RATE: 70 BPM | OXYGEN SATURATION: 97 % | RESPIRATION RATE: 18 BRPM | WEIGHT: 130.07 LBS | SYSTOLIC BLOOD PRESSURE: 143 MMHG | TEMPERATURE: 98 F

## 2023-02-18 PROCEDURE — 99283 EMERGENCY DEPT VISIT LOW MDM: CPT

## 2023-02-18 PROCEDURE — 99284 EMERGENCY DEPT VISIT MOD MDM: CPT

## 2023-02-18 RX ORDER — LORATADINE 10 MG/1
10 TABLET ORAL DAILY
Refills: 0 | Status: DISCONTINUED | OUTPATIENT
Start: 2023-02-18 | End: 2023-02-21

## 2023-02-18 RX ORDER — LORATADINE 10 MG/1
1 TABLET ORAL
Qty: 14 | Refills: 0
Start: 2023-02-18 | End: 2023-03-03

## 2023-02-18 RX ORDER — FLUORESCEIN SODIUM 9 MG
1 STRIP OPHTHALMIC (EYE) ONCE
Refills: 0 | Status: DISCONTINUED | OUTPATIENT
Start: 2023-02-18 | End: 2023-02-21

## 2023-02-18 RX ADMIN — LORATADINE 10 MILLIGRAM(S): 10 TABLET ORAL at 02:36

## 2023-02-18 NOTE — ED PROVIDER NOTE - NSFOLLOWUPCLINICS_GEN_ALL_ED_FT
[facility] - Ophthalmology  Ophthalmology  [address], [address]  [place] [address]  Phone: [phone]  Fax:   Follow Up Time: 1-3 Days     [facility] - Ophthalmology  Ophthalmology  [address], [address]  [place] [address]  Phone: [phone]  Fax:   Follow Up Time: 1-3 Days     [facility] - Ophthalmology  Ophthalmology  [address]  [place] [address]  Phone: [phone]  Fax:   Follow Up Time: 1-3 Days

## 2023-02-18 NOTE — ED PROVIDER NOTE - PATIENT PORTAL LINK FT
You can access the FollowMyHealth Patient Portal offered by [facility] by registering at the following website: [email or web address]. By joining [employer]’s FollowMyHealth portal, you will also be able to view your health information using other applications (apps) compatible with our system. You can access the FollowMyHealth Patient Portal offered by [facility] by registering at the following website: [email or web address]. By joining [employer]’s FollowMyHealth portal, you will also be able to view your health information using other applications (apps) compatible with our system. You can access the FollowMyHealth Patient Portal offered by [facility] by registering at the following website: [email or web address]. By joining [employer]’s FollowMyHealth portal, you will also be able to view your health information using other applications (apps) compatible with our system.

## 2023-02-18 NOTE — ED PROVIDER NOTE - NSFOLLOWUPCLINICSTOKEN_GEN_ALL_ED_FT
500907:1-3 Days|| ||00\01||False; [id number]:1-3 Days|| ||00\01||False; 122955:1-3 Days|| ||00\01||False;

## 2023-02-18 NOTE — ED PROVIDER NOTE - CLINICAL SUMMARY MEDICAL DECISION MAKING FREE TEXT BOX
57y F with PMH hypothyroidism, HTN presenting with 5d of eye redness and itching. Per pt and daughter, pt had sudden onset of itching and redness b/l in eyes.    VS show /72, otherwise WNL. PE shows EOMI, b/l scleral redness, watery discharge. No eyelid swelling or redness. No pain with ranging of eyes. Negative fluorescein stain test - no uptake in eye; no miguel's sign. Pressure 11 on R; 14 on L.   High suspicion for allergic conjunctivitis. Possible dry eyes associated with hypothyroidism. Considered corneal ulcers and glaucoma, but findings were negative -- normal fluorescein stain exam and eye pressures. Plan: claritin, reassess; arrange for follow up with ophtho

## 2023-02-18 NOTE — ED PROVIDER NOTE - NSFOLLOWUPINSTRUCTIONS_ED_ALL_ED_FT
Take Claritin (also known as loratadine), once per day to help with itching symptoms.  Use artificial teardrops in the eyes to prevent eye dryness.    Continue taking your medications as prescribed.  Follow-up with your primary care doctor within 1 to 3 days.  Follow-up with an ophthalmologist within 1 to 2 days    Come back to the emergency department immediately if you develop any swelling around the eyes, have difficulty seeing, or have any other worsening or concerning symptoms.

## 2023-02-18 NOTE — ED PROVIDER NOTE - PHYSICAL EXAMINATION
LOS:     VITALS:   T(C): 36.7 (02-18-23 @ 00:28), Max: 36.7 (02-18-23 @ 00:28)  HR: 70 (02-18-23 @ 00:28) (70 - 70)  BP: 143/72 (02-18-23 @ 00:28) (143/72 - 143/72)  RR: 18 (02-18-23 @ 00:28) (18 - 18)  SpO2: 97% (02-18-23 @ 00:28) (97% - 97%)    GENERAL: NAD, lying in bed comfortably  HEAD:  Atraumatic, Normocephalic  EYES: EOMI, b/l scleral redness, watery discharge. No eyelid swelling or redness. No pain with ranging of eyes. Negative fluorescein stain test - no uptake in eye; no miguel's sign. Pressure 11 on R; 14 on L.   ENT: Moist mucous membranes  NECK: Supple  CHEST/LUNG: Clear to auscultation bilaterally; No rales, rhonchi, wheezing, or rubs. Unlabored respirations  HEART: Regular rate and rhythm; No murmurs, rubs, or gallops  ABDOMEN: Soft, nontender, nondistended  EXTREMITIES:  No edema  NERVOUS SYSTEM:  No focal deficits   SKIN: No rashes or lesions

## 2023-02-18 NOTE — ED PROVIDER NOTE - ATTENDING CONTRIBUTION TO CARE
RGUJRAL 58yo female hx listed BIB daughter for b/l eye redness and itching x few days. Denies any pain, or change in vision. No HA, URI symptoms, f/c. No pain with movement of eye. Pt reports clear discharge. No new contact exposure. On exam, + mild conjunctival injection, no fluorescein uptake, IOP checking and within normal limits. Pt states she cannot read letters to check [place] but denies any change in vision. Pt well appearing, neuro intact. Full EOMI w no pain. Discussed with daughter, ddx contact allergy, dry eyes, optho follow up discussed with thyroid history will call patient to arrange. Pt with improved symptoms with anti histamines. Discharge plan and return precautions discussed.

## 2023-02-18 NOTE — ED PROVIDER NOTE - PROGRESS NOTE DETAILS
Alma Khoury, PGY-1: Pt reports improvement of itching in eyes after claritin.    Contacted on-call ophtho resident who will help arrange for outpatient appt within 1-2 days

## 2023-02-18 NOTE — ED PROVIDER NOTE - OBJECTIVE STATEMENT
57y F with PMH hypothyroidism, HTN presenting with 5d of eye redness and itching. Per pt and daughter, pt had sudden onset of itching and redness b/l in eyes. Has been using ketotifen and artificial tear drops at home. Pt reports clear discharge from both eyes and sensation of grittiness in eyes b/l. Denies any purulence, f/c, n/v, cp/sob, rashes, ear pain, double vision, blurry vision, sick contacts, recent travel.

## 2025-01-07 ENCOUNTER — EMERGENCY (EMERGENCY)
Facility: HOSPITAL | Age: 60
LOS: 0 days | Discharge: HOME | End: 2025-01-07
Attending: STUDENT IN AN ORGANIZED HEALTH CARE EDUCATION/TRAINING PROGRAM
Payer: MEDICAID

## 2025-01-07 VITALS
HEIGHT: 62 IN | HEART RATE: 99 BPM | TEMPERATURE: 98 F | OXYGEN SATURATION: 99 % | WEIGHT: 130.07 LBS | DIASTOLIC BLOOD PRESSURE: 92 MMHG | SYSTOLIC BLOOD PRESSURE: 147 MMHG | RESPIRATION RATE: 17 BRPM

## 2025-01-07 RX ORDER — ACETAMINOPHEN 80 MG/.8ML
975 SOLUTION/ DROPS ORAL ONCE
Refills: 0 | Status: COMPLETED | OUTPATIENT
Start: 2025-01-07 | End: 2025-01-07

## 2025-01-07 RX ADMIN — ACETAMINOPHEN 975 MILLIGRAM(S): 80 SOLUTION/ DROPS ORAL at 22:32

## 2025-01-07 NOTE — ED PROVIDER NOTE - PHYSICAL EXAMINATION
VITAL SIGNS: I have reviewed nursing notes and confirm.  CONSTITUTIONAL: well-appearing, non-toxic, NAD  SKIN: Warm dry, normal skin turgor  HEAD: NCAT  EYES: EOMI, PERRLA, no scleral icterus  ENT: Moist mucous membranes, normal pharynx with no erythema or exudates  NECK: Supple; non tender. Full ROM.   CARD: RRR, no murmurs, rubs or gallops  RESP: clear to ausculation b/l.  No rales, rhonchi, or wheezing.  ABD: soft, + BS, non-tender, non-distended, no rebound or guarding. No CVA tenderness  EXT: Full ROM  NEURO: normal motor. normal sensory. CN II-XII intact. Cerebellar testing normal. Normal gait.  PSYCH: Cooperative, appropriate.

## 2025-01-07 NOTE — ED ADULT NURSE NOTE - OBJECTIVE STATEMENT
BIBEMS from home, Accompanied by son, AOx3, responsive, ambulatory. Pt c/o hypertension 185/126 at home, c/o headache started 4 hours ago.  denies n/v/d, dizziness, CP/SOB/difficulty breathing, vision changes; states compliance with HTN medications. Pmhx HTN, HLD, Asthma, migraines. NKDA

## 2025-01-07 NOTE — ED ADULT TRIAGE NOTE - CHIEF COMPLAINT QUOTE
BIBEMS from home. Accompanied by son. Pt c/o hypertension. 185/126. headache  Pmhx HTn HLD, Asthma. NKDA

## 2025-01-07 NOTE — ED PROVIDER NOTE - CLINICAL SUMMARY MEDICAL DECISION MAKING FREE TEXT BOX
59-year-old female with history of hypertension presenting to the ED with complaints of headache, patient currently takes amlodipine and losartan ran out of losartan 1 days ago, has not taken it today, patient with no other complaints of chest pain shortness of breath or abdominal pain, patient has not take anything for her headache.  Usually gets headaches and checks blood pressure which is high.  Patient with no reported nausea vomiting, neurologically intact and a stroke scale 0.  Patient has refills at her pharmacy, spoke with family, would like to take patient home for her migraine to get better.  Patient with history of migraines, this is not unusual for her, no other reported complaints. f/u PCP, return precautions

## 2025-01-07 NOTE — ED PROVIDER NOTE - PATIENT PORTAL LINK FT
You can access the FollowMyHealth Patient Portal offered by [facility] by registering at the following website: [email or web address]. By joining [email or web address]’s FollowMyHealth portal, you will also be able to view your health information using other applications (apps) compatible with our system.

## 2025-01-07 NOTE — ED ADULT TRIAGE NOTE - BEFAST BALANCE
No AICD (automatic cardioverter/defibrillator) present  PPM  S/P colon resection  2010  S/P eye surgery  s/p left corneal transplant and glaucoma surgery  S/P left cataract extraction    S/P TURP  2003

## 2025-01-07 NOTE — ED ADULT NURSE NOTE - NSFALLUNIVINTERV_ED_ALL_ED
Bed/Stretcher in lowest position, wheels locked, appropriate side rails in place/Call bell, personal items and telephone in reach/Instruct patient to call for assistance before getting out of bed/chair/stretcher/Non-slip footwear applied when patient is off stretcher/[place] to call system/Physically safe environment - no spills, clutter or unnecessary equipment/Purposeful proactive rounding/Room/bathroom lighting operational, light cord in reach

## 2025-03-10 NOTE — ED PROVIDER NOTE - PHYSICAL EXAMINATION
Addended by: MANINDER POLO on: 3/10/2025 09:30 AM     Modules accepted: Orders     Dr Maldonado  nontoxic laying down, able to walk in ED without assistance  no resp distress  no shortening or rotation of bl lower ext. nl ORM of bl hips/knees/ankles. mild swelling to the lateral foot, no erythema no skin changes  no leg swelling or calf pain

## 2025-05-05 ENCOUNTER — APPOINTMENT (OUTPATIENT)
Age: 60
End: 2025-05-05